# Patient Record
Sex: FEMALE | Race: WHITE | URBAN - METROPOLITAN AREA
[De-identification: names, ages, dates, MRNs, and addresses within clinical notes are randomized per-mention and may not be internally consistent; named-entity substitution may affect disease eponyms.]

---

## 2024-06-28 NOTE — PROGRESS NOTES
Patient Name: Lala Newton : 1980        Date: 2024      Type of Appt: Consult    Reason for appt: C: VERBAL REFERRAL FROM ISAMAR CLEMENTE. NECK PAIN, PATIENT IS AWARE TO BRING MRI AND X-RAY DISC.     Referred by: ISAMAR CLEMENTE      Smoking: Rosana                        [facility]  [facility]  [address], [address]  [place], [place]  P: [phone]  F: [phone]          Patient:  Lala Newton  YOB: 1980  Date: 2024    The patient is a 44 y.o. female who presents today for evaluation of the following problems:     Chief Complaint   Patient presents with    Neck Pain        Referred by No ref. provider found      PAST MEDICAL, FAMILY AND SOCIAL HISTORY:  No past medical history on file.  No past surgical history on file.  No family history on file.  Current Outpatient Medications on File Prior to Visit   Medication Sig Dispense Refill    levothyroxine (SYNTHROID) 50 MCG tablet Take 1 tablet by mouth every morning (before breakfast)      atenolol (TENORMIN) 25 MG tablet Take 1 tablet by mouth daily      UBRELVY 100 MG TABS Take 100 mg by mouth 2 times daily as needed      norethindrone (MICRONOR) 0.35 MG tablet Every morning       No current facility-administered medications on file prior to visit.          ALLERGIES  Allergies   Allergen Reactions    Bee Pollen Anaphylaxis    Bee Venom Anaphylaxis, Hives, Itching and Swelling     Other Reaction(s): hives,swelling    Cefdinir Swelling    Ciprofloxacin Swelling    Methylprednisolone Other (See Comments)     Other Reaction(s): muscle tightening    Other Reaction(s): cramping, facial redness      Other Reaction(s): Cramping of the Muscles         REVIEW OF SYSTEMS:  Review of Systems   Constitutional:  Negative for fever.   HENT:  Positive for hearing loss.    Eyes:  Negative for pain.   Respiratory:  Negative for shortness of breath.    Gastrointestinal:  Negative for constipation

## 2024-07-02 ENCOUNTER — INITIAL CONSULT (OUTPATIENT)
Dept: NEUROSURGERY | Age: 44
End: 2024-07-02
Payer: COMMERCIAL

## 2024-07-02 VITALS
WEIGHT: 195 LBS | HEIGHT: 59 IN | DIASTOLIC BLOOD PRESSURE: 78 MMHG | SYSTOLIC BLOOD PRESSURE: 122 MMHG | TEMPERATURE: 98.3 F | BODY MASS INDEX: 39.31 KG/M2

## 2024-07-02 DIAGNOSIS — M47.812 CERVICAL SPONDYLOSIS: Primary | ICD-10-CM

## 2024-07-02 PROCEDURE — 99204 OFFICE O/P NEW MOD 45 MIN: CPT | Performed by: NEUROLOGICAL SURGERY

## 2024-07-02 RX ORDER — ATENOLOL 25 MG/1
25 TABLET ORAL DAILY
COMMUNITY
Start: 2023-11-15 | End: 2024-11-14

## 2024-07-02 RX ORDER — UBROGEPANT 100 MG/1
100 TABLET ORAL 2 TIMES DAILY PRN
COMMUNITY
Start: 2023-12-22

## 2024-07-02 RX ORDER — ACETAMINOPHEN AND CODEINE PHOSPHATE 120; 12 MG/5ML; MG/5ML
SOLUTION ORAL
COMMUNITY
Start: 2023-12-28

## 2024-07-02 RX ORDER — LEVOTHYROXINE SODIUM 0.05 MG/1
50 TABLET ORAL
COMMUNITY
Start: 2024-06-04 | End: 2025-06-04

## 2024-07-02 ASSESSMENT — ENCOUNTER SYMPTOMS
EYE PAIN: 0
CONSTIPATION: 0
BACK PAIN: 1
NAUSEA: 0
SHORTNESS OF BREATH: 0